# Patient Record
(demographics unavailable — no encounter records)

---

## 2024-11-29 NOTE — ASSESSMENT
[Verbal] : verbal [Patient] : patient [Good - alert, interested, motivated] : Good - alert, interested, motivated [FreeTextEntry1] : Patient examined, history and chart reviewed  Patient returns today for f/u of nail avulsion site appears clean, dry and healthy with no surrounding erythema or edema patient relays no pain to the site left foot 3rd digit checked -apparent nail trauma at nail matrix but nail is well adhered to nail bed and no drainage appreciated -no need for any removal at this time  can continue band aid dressing for a few days and then resume normal shoe wear   - RTC prn

## 2024-11-29 NOTE — VITALS
[Burning] : burning [Aching] : aching [Numb] : numb [FreeTextEntry3] : feet  [FreeTextEntry1] : walking [FreeTextEntry2] : resting

## 2024-11-29 NOTE — PHYSICAL EXAM
[General Appearance - Alert] : alert [General Appearance - In No Acute Distress] : in no acute distress [No Joint Swelling] : no joint swelling [Normal Foot/Ankle] : Both lower extremities were exposed and visualized. Standing exam demonstrates normal foot posture and alignment. Hindfoot exam shows no hindfoot valgus or varus [Skin Color & Pigmentation] : normal skin color and pigmentation [Skin Turgor] : normal skin turgor [Skin Lesions] : no skin lesions [Right Foot Was Examined] : The right foot was examined [Normal Appearance] : normal in appearance [Normal in Appearance] : normal in appearance [Normal] : normal [Full ROM] : with full range of motion [2+] : 2+ in the dorsalis pedis [Position Sense Dec.] : diminished position sense at the level of the toes [Sensation] : the sensory exam was normal to light touch and pinprick [No Focal Deficits] : no focal deficits [Deep Tendon Reflexes (DTR)] : deep tendon reflexes were 2+ and symmetric [Motor Exam] : the motor exam was normal [Vibration Dec.] : diminished vibratory sensation at the level of the toes [Diminished Throughout Right Foot] : diminished sensation with monofilament testing throughout right foot [Diminished Throughout Left Foot] : diminished sensation with monofilament testing throughout left foot [Oriented To Time, Place, And Person] : oriented to person, place, and time [Ankle Swelling (On Exam)] : not present [Varicose Veins Of Lower Extremities] : not present [] : not present [Foot Ulcer] : no foot ulcer [Skin Induration] : no skin induration [Tenderness] : not tender [Erythema] : not erythematous [Delayed in the Right Toes] : normal in the toes [Swelling] : not swollen [Delayed in the Left Toes] : normal in the toes [FreeTextEntry1] : nail removal site apears clean, dry, healthy

## 2024-11-29 NOTE — HISTORY OF PRESENT ILLNESS
[Sneakers] : jade [FreeTextEntry1] : PEREZ RUTLEDGE   presents for a Diabetic foot examination, patient was referred by PCP. Patient complains of numbness and pain of both feet and denies an acute injury.  Patient denies NVFC and denies SOB. pt has some redness on the right big hallux and requested to get it checked. requested nail trimming   Here for f/u of partial nail avulsion R hallux. Also said he kicked an iron leona this week and now would like Left 3rd digit checked

## 2024-11-29 NOTE — PROCEDURE
[Partial Nail Avulsion] : partial nail avulsion on [Right Foot] : was performed on the right foot [Patient] : the patient [___ ml Inj] : [unfilled] ~Uml [1%] : 1%  [FreeTextEntry7] : 4

## 2025-02-05 NOTE — REVIEW OF SYSTEMS
[Negative] : Heme/Lymph [Recent Change In Weight] : ~T recent weight change [Fever] : no fever [Chills] : no chills [Fatigue] : no fatigue [Night Sweats] : no night sweats [Earache] : no earache [Nasal Discharge] : no nasal discharge [Chest Pain] : no chest pain [Palpitations] : no palpitations [Claudication] : no  leg claudication [Orthopena] : no orthopnea [Paroxysmal Nocturnal Dyspnea] : no paroxysmal nocturnal dyspnea [Shortness Of Breath] : no shortness of breath [Wheezing] : no wheezing [Cough] : no cough [Dyspnea on Exertion] : not dyspnea on exertion [Abdominal Pain] : no abdominal pain [Nausea] : no nausea [Constipation] : no constipation [Diarrhea] : no diarrhea [Vomiting] : no vomiting [Heartburn] : no heartburn [Frequency] : no frequency [Joint Pain] : no joint pain [Joint Stiffness] : no joint stiffness [Muscle Pain] : no muscle pain [Back Pain] : no back pain [Joint Swelling] : no joint swelling [Suicidal] : not suicidal

## 2025-02-05 NOTE — PHYSICAL EXAM
[No Acute Distress] : no acute distress [Well Nourished] : well nourished [Well Developed] : well developed [Well-Appearing] : well-appearing [Normal Sclera/Conjunctiva] : normal sclera/conjunctiva [EOMI] : extraocular movements intact [Normal Outer Ear/Nose] : the outer ears and nose were normal in appearance [Supple] : supple [No Respiratory Distress] : no respiratory distress  [No Accessory Muscle Use] : no accessory muscle use [Clear to Auscultation] : lungs were clear to auscultation bilaterally [Normal Rate] : normal rate  [Regular Rhythm] : with a regular rhythm [Normal S1, S2] : normal S1 and S2 [No Murmur] : no murmur heard [Soft] : abdomen soft [Non Tender] : non-tender [No CVA Tenderness] : no CVA  tenderness [No Joint Swelling] : no joint swelling [No Rash] : no rash [de-identified] : resting tremor b/l

## 2025-02-05 NOTE — ASSESSMENT
[FreeTextEntry1] : Patient is a 61-year-old male with PMHx uncontrolled DM, CKD 3, HTN, HLD, urinary incontinence and bipolar disease  #diabetes type II w/ neuropathy - A1C 9.4-->7.9; mounjaro was increased to 15 - following endo, latest endo note noted - following w/ neuro for neuropathy - pod UTD - seen last by optho 3 years ago, referral sent during this visit - Counseled regarding weight loss and lifestyle modifications  # dyslipidemia  - Lipid panel Jan 2025 unremarkable - on lipitor 40 - was on fenofibrate previously, but pharmacy hasnt been refilling this medication due to risk of adverse effects, but latest lipid panel is off of this medication, therefore okay to keep off of this medication - zetia 10  #HTN - added HCTZ 25mg during this visit -continue with Lisinopril 20 mg qd  # urinary incontinence - stable  - on oxybutynin 10 qd  # obesity  - on mounjaro 15  - counselled on lifestyle modification as above, pt expressed understanding  #psychiatric history of bipolar - following up with current psychiatric clinic - paty si/hi - at [place] [facility] - [place] annual physical paperwork filled in office  #HCM - EKG obtained during this visit as part of the annual physical paperwork was unremarkable - Pt said he would consider colonoscopy; GI referral was sent during the last visit, pt was instructed to make a GI appointment - RTC in 6 months with repeat labs

## 2025-02-05 NOTE — PHYSICAL EXAM
[No Acute Distress] : no acute distress [Well Nourished] : well nourished [Well Developed] : well developed [Well-Appearing] : well-appearing [Normal Sclera/Conjunctiva] : normal sclera/conjunctiva [EOMI] : extraocular movements intact [Normal Outer Ear/Nose] : the outer ears and nose were normal in appearance [Supple] : supple [No Respiratory Distress] : no respiratory distress  [No Accessory Muscle Use] : no accessory muscle use [Clear to Auscultation] : lungs were clear to auscultation bilaterally [Normal Rate] : normal rate  [Regular Rhythm] : with a regular rhythm [Normal S1, S2] : normal S1 and S2 [No Murmur] : no murmur heard [Soft] : abdomen soft [Non Tender] : non-tender [No CVA Tenderness] : no CVA  tenderness [No Joint Swelling] : no joint swelling [No Rash] : no rash [de-identified] : resting tremor b/l

## 2025-02-05 NOTE — HISTORY OF PRESENT ILLNESS
[FreeTextEntry1] : 6 month follow up [de-identified] : Patient is a 61-year-old male with PMHx poorly controlled DM with peripheral neuropathy, HTN, HLD, chronic urinary incontinence and bipolar presenting for follow up; Pt ambulates w/ cane  Pt reports he is in usual state of health and comes in with forms from [place]. Otherwise denies recent illness, sob, cp.   Pt is also requesting a prescription for a shower chair during this visit

## 2025-02-05 NOTE — HISTORY OF PRESENT ILLNESS
[FreeTextEntry1] : 6 month follow up [de-identified] : Patient is a 61-year-old male with PMHx poorly controlled DM with peripheral neuropathy, HTN, HLD, chronic urinary incontinence and bipolar presenting for follow up; Pt ambulates w/ cane  Pt reports he is in usual state of health and comes in with forms from [place]. Otherwise denies recent illness, sob, cp.   Pt is also requesting a prescription for a shower chair during this visit

## 2025-02-05 NOTE — ASSESSMENT
[FreeTextEntry1] : Patient is a 61-year-old male with PMHx uncontrolled DM, CKD 3, HTN, HLD, urinary incontinence and bipolar disease  #diabetes type II w/ neuropathy - A1C 9.4-->7.9; mounjaro was increased to 15 - following endo, latest endo note noted - following w/ neuro for neuropathy - pod UTD - seen last by optho 3 years ago, referral sent during this visit - Counseled regarding weight loss and lifestyle modifications  # dyslipidemia  - Lipid panel Jan 2025 unremarkable - on lipitor 40 - was on fenofibrate previously, but pharmacy hasnt been refilling this medication due to risk of adverse effects, but latest lipid panel is off of this medication, therefore okay to keep off of this medication - zetia 10  #HTN - added HCTZ 25mg during this visit -continue with Lisinopril 20 mg qd  # urinary incontinence - stable  - on oxybutynin 10 qd  # obesity  - on mounjaro 15  - counselled on lifestyle modification as above, pt expressed understanding  #psychiatric history of bipolar - following up with current psychiatric clinic - paty si/hi - at [place] [facility] - [place] annual physical paperwork filled in office  #HCM - EKG obtained during this visit as part of the annual physical paperwork was unremarkable - Pt said he would consider colonoscopy; GI referral was sent during the last visit, pt was instructed to make a GI appointment - RTC in 6 months with repeat labs

## 2025-02-25 NOTE — PHYSICAL EXAM
[General Appearance - Alert] : alert [General Appearance - In No Acute Distress] : in no acute distress [Ankle Swelling (On Exam)] : not present [Varicose Veins Of Lower Extremities] : not present [No Joint Swelling] : no joint swelling [Normal Foot/Ankle] : Both lower extremities were exposed and visualized. Standing exam demonstrates normal foot posture and alignment. Hindfoot exam shows no hindfoot valgus or varus [Skin Color & Pigmentation] : normal skin color and pigmentation [Skin Turgor] : normal skin turgor [] : no rash [Skin Lesions] : no skin lesions [Foot Ulcer] : no foot ulcer [Skin Induration] : no skin induration [Right Foot Was Examined] : The right foot was examined [Normal Appearance] : normal in appearance [Tenderness] : not tender [Erythema] : not erythematous [Delayed in the Right Toes] : normal in the toes [Normal in Appearance] : normal in appearance [Normal] : normal [Full ROM] : with full range of motion [Swelling] : not swollen [Delayed in the Left Toes] : normal in the toes [2+] : 2+ in the dorsalis pedis [Position Sense Dec.] : diminished position sense at the level of the toes [FreeTextEntry1] : nail removal site apears clean, dry, healthy  [Sensation] : the sensory exam was normal to light touch and pinprick [No Focal Deficits] : no focal deficits [Deep Tendon Reflexes (DTR)] : deep tendon reflexes were 2+ and symmetric [Motor Exam] : the motor exam was normal [Vibration Dec.] : diminished vibratory sensation at the level of the toes [Diminished Throughout Right Foot] : diminished sensation with monofilament testing throughout right foot [Diminished Throughout Left Foot] : diminished sensation with monofilament testing throughout left foot [Oriented To Time, Place, And Person] : oriented to person, place, and time

## 2025-02-25 NOTE — HISTORY OF PRESENT ILLNESS
[Sneakers] : jade [FreeTextEntry1] : PEREZ RUTLEDGE   presents for a Diabetic foot examination, patient was referred by PCP. Patient complains of numbness and pain of both feet and denies an acute injury.  Patient denies NVFC and denies SOB. pt has some redness on the right big hallux and requested to get it checked. requested nail trimming   Here for f/u of partial nail avulsion R hallux

## 2025-02-25 NOTE — ASSESSMENT
[FreeTextEntry1] : Patient examined, history and chart reviewed  Patient returns today for f/u of nail avulsion site appears clean, dry and healthy with no surrounding erythema or edema patient relays no pain to the site -no need for any removal at this time nails debrided x10    - RTC prn      [Verbal] : verbal [Patient] : patient [Good - alert, interested, motivated] : Good - alert, interested, motivated

## 2025-05-28 NOTE — ASSESSMENT
[FreeTextEntry1] :  61-year-old male with PMHx uncontrolled DM, CKD 3, HTN, HLD, urinary incontinence and bipolar disease here for follow up.   #Resting tremors - psych requested to speak with PCP - STRATED ON METOPROLOL 25MG QD - neurology follow up  #diabetes type II w/ neuropathy - A1C 9.4-->7.9; mounjaro was increased to 15 - following endo, latest endo note noted - following w/ neuro for neuropathy - pod UTD - seen last by optho 3 years ago, referral sent during this visit - Counseled regarding weight loss and lifestyle modifications  # dyslipidemia - Lipid panel Jan 2025 unremarkable - on lipitor 40 - was on fenofibrate previously, but pharmacy hasnt been refilling this medication due to risk of adverse effects, but latest lipid panel is off of this medication, therefore okay to keep off of this medication - zetia 10  #HTN - added HCTZ 25mg during this visit -continue with Lisinopril 20 mg qd  # urinary incontinence - stable - on oxybutynin 10 qd  # obesity - on mounjaro 15 - counselled on lifestyle modification as above, pt expressed understanding  #psychiatric history of bipolar - following up with current psychiatric clinic - denies si/hi - at [place] [facility] - [place] annual physical paperwork filled in office  #HCM - EKG obtained during last visit as part of the annual physical paperwork was unremarkable - Pt said he would consider colonoscopy; GI referral was sent during the last visit, pt was instructed to make a GI appointment - RTC in 6 months with repeat labs.

## 2025-05-28 NOTE — HISTORY OF PRESENT ILLNESS
[FreeTextEntry1] : follow up.  [de-identified] : 61-year-old male with PMHx poorly controlled DM with peripheral neuropathy, HTN, HLD, chronic urinary incontinence and bipolar presenting for follow up; Pt ambulates w/ cane Pt reports he is in usual state of health and comes in with forms from [place]. Otherwise denies recent illness, sob, cp. Pt has resting tremors likely side effect of medications.

## 2025-05-28 NOTE — ASSESSMENT
[FreeTextEntry1] :  61-year-old male with PMHx uncontrolled DM, CKD 3, HTN, HLD, urinary incontinence and bipolar disease here for follow up.   #Resting tremors - psych requested to speak with PCP - STRATED ON METOPROLOL 25MG QD - neurology follow up  #diabetes type II w/ neuropathy - A1C 9.4-->7.9; mounjaro was increased to 15 - following endo, latest endo note noted - following w/ neuro for neuropathy - pod UTD - seen last by optho 3 years ago, referral sent during this visit - Counseled regarding weight loss and lifestyle modifications  # dyslipidemia - Lipid panel Jan 2025 unremarkable - on lipitor 40 - was on fenofibrate previously, but pharmacy hasnt been refilling this medication due to risk of adverse effects, but latest lipid panel is off of this medication, therefore okay to keep off of this medication - zetia 10  #HTN - added HCTZ 25mg during this visit -continue with Lisinopril 20 mg qd  # urinary incontinence - stable - on oxybutynin 10 qd  # obesity - on mounjaro 15 - counselled on lifestyle modification as above, pt expressed understanding  #psychiatric history of bipolar - following up with current psychiatric clinic - denies si/hi - at [facility] - [place] annual physical paperwork filled in office  #HCM - EKG obtained during last visit as part of the annual physical paperwork was unremarkable - Pt said he would consider colonoscopy; GI referral was sent during the last visit, pt was instructed to make a GI appointment - RTC in 6 months with repeat labs.

## 2025-05-28 NOTE — HISTORY OF PRESENT ILLNESS
[FreeTextEntry1] : follow up.  [de-identified] : 61-year-old male with PMHx poorly controlled DM with peripheral neuropathy, HTN, HLD, chronic urinary incontinence and bipolar presenting for follow up; Pt ambulates w/ cane Pt reports he is in usual state of health and comes in with forms from [place]. Otherwise denies recent illness, sob, cp. Pt has resting tremors likely side effect of medications.

## 2025-05-28 NOTE — PHYSICAL EXAM
[No Acute Distress] : no acute distress [No Respiratory Distress] : no respiratory distress  [No Accessory Muscle Use] : no accessory muscle use [Clear to Auscultation] : lungs were clear to auscultation bilaterally [Normal Rate] : normal rate  [No Edema] : there was no peripheral edema [Soft] : abdomen soft [Non Tender] : non-tender [Non-distended] : non-distended [No Joint Swelling] : no joint swelling [No Rash] : no rash

## 2025-06-03 NOTE — PHYSICAL EXAM
[General Appearance - Alert] : alert [General Appearance - In No Acute Distress] : in no acute distress [No Joint Swelling] : no joint swelling [Normal Foot/Ankle] : Both lower extremities were exposed and visualized. Standing exam demonstrates normal foot posture and alignment. Hindfoot exam shows no hindfoot valgus or varus [Skin Color & Pigmentation] : normal skin color and pigmentation [Skin Turgor] : normal skin turgor [Skin Lesions] : no skin lesions [Right Foot Was Examined] : The right foot was examined [Normal Appearance] : normal in appearance [Normal in Appearance] : normal in appearance [Normal] : normal [Full ROM] : with full range of motion [2+] : 2+ in the dorsalis pedis [Position Sense Dec.] : diminished position sense at the level of the toes [Sensation] : the sensory exam was normal to light touch and pinprick [No Focal Deficits] : no focal deficits [Deep Tendon Reflexes (DTR)] : deep tendon reflexes were 2+ and symmetric [Motor Exam] : the motor exam was normal [Vibration Dec.] : diminished vibratory sensation at the level of the toes [Diminished Throughout Right Foot] : diminished sensation with monofilament testing throughout right foot [Diminished Throughout Left Foot] : diminished sensation with monofilament testing throughout left foot [Oriented To Time, Place, And Person] : oriented to person, place, and time [Ankle Swelling (On Exam)] : not present [Varicose Veins Of Lower Extremities] : not present [] : not present [Foot Ulcer] : no foot ulcer [Skin Induration] : no skin induration [Tenderness] : not tender [Erythema] : not erythematous [Delayed in the Right Toes] : normal in the toes [Swelling] : not swollen [Delayed in the Left Toes] : normal in the toes [FreeTextEntry1] : Toenails 1-10 dystrophic elongated

## 2025-06-03 NOTE — HISTORY OF PRESENT ILLNESS
[Sneakers] : jade [FreeTextEntry1] : PEREZ RUTLEDGE   presents for a Diabetic foot examination and routine nail care. No other pedal complaints today

## 2025-06-03 NOTE — ASSESSMENT
[Verbal] : verbal [Patient] : patient [Good - alert, interested, motivated] : Good - alert, interested, motivated [FreeTextEntry1] : Patient examined, history and chart reviewed DM foot check performed Nails debrided x10 with sterile nail nippers RTC 3 months

## 2025-06-11 NOTE — REVIEW OF SYSTEMS
[Arm Weakness] : arm weakness [Leg Weakness] : leg weakness [Poor Coordination] : poor coordination [Numbness] : numbness [Tingling] : tingling [Abnormal Sensation] : an abnormal sensation [Difficulty Walking] : difficulty walking [Ataxia] : ataxia [Negative] : Heme/Lymph

## 2025-06-11 NOTE — HISTORY OF PRESENT ILLNESS
[FreeTextEntry1] : Original Presentation : Mr. Fernandes is a 59-year-old male who presents today in neurologic consultation regarding his neuropathy which he was diagnosed as having 3 years ago by Dr. Hylton. We do not have his EMG records at the present time. He has had diabetes since 1996. He has had increasing gait disturbance, equilibrium problems, and weakness in both lower extremities and left arm. He did fall about 2 years ago and currently uses a walker. Patient also has tingling in his toes and tremors in his hands. Tremors have been attributed to the neuroleptics he's been taking. He has history of bipolar disorder and multiple suicide attempts.  MRI Brain 5.30.23 : I Nonspecific punctate T2 hyperintensity are seen in each cerebral hemisphere without  mass-effect restricted diffusion or vasogenic edema and based on the patients age is most likely  secondary to ischemic microvascular changes. Differential diagnoses as above. Ventricular, sulcal, and  cisternal dilatation, more than expected for the patients age.  EMG Lower : + diffuse sensori-neuropathy b/l  EMG upper : + evidence of compression of the left median nerve consistent with left carpal tunnel   Today : Today I had the pleasure of seeing Mr Fernandes in our office for follow up.  The patients previous history and physical findings have been reviewed.   Mr Fernandes remains under our care for diabetic polyneuropathy to the hands and feet, chronic conditions for which he is receiving active treatment for. He reports that in 2020 he went for a walk and was unable to walk home due to severe weakness, he was admitted the hospital, MRIS of the Brain,  Lumbar and cervical spine were done showing disc herniations without explanation of his weakness. He was later discharged to a nursing home where he did physical therapy and had mild improvement in his strength while ambulating.  On exam he maintains good isolated strength to all extremities 5/5 distally and 4-/5 proximally. He denies any pain and has no tenderness to palpation. His reflexes are 2+ throughout without hyperreflexia or clonus. We discussed the last visit, that his tremors are most likely the result of being treated with neuroleptics for schizophrenia and bipolar disorder. He has been on and continues to be on neuroleptics, specifically, Abilify.

## 2025-06-11 NOTE — PHYSICAL EXAM
[General Appearance - Alert] : alert [General Appearance - In No Acute Distress] : in no acute distress [Oriented To Time, Place, And Person] : oriented to person, place, and time [Affect] : the affect was normal [Impaired Insight] : insight and judgment were intact [Place] : oriented to place [Person] : oriented to person [Time] : oriented to time [Concentration Intact] : normal concentrating ability [Visual Intact] : visual attention was ~T not ~L decreased [Naming Objects] : no difficulty naming common objects [Repeating Phrases] : no difficulty repeating a phrase [Writing A Sentence] : no difficulty writing a sentence [Fluency] : fluency intact [Reading] : reading intact [Comprehension] : comprehension intact [Past History] : adequate knowledge of personal past history [Cranial Nerves Oculomotor (III)] : extraocular motion intact [Cranial Nerves Optic (II)] : visual acuity intact bilaterally,  visual fields full to confrontation, pupils equal round and reactive to light [Cranial Nerves Trigeminal (V)] : facial sensation intact symmetrically [Cranial Nerves Facial (VII)] : face symmetrical [Cranial Nerves Vestibulocochlear (VIII)] : hearing was intact bilaterally [Cranial Nerves Glossopharyngeal (IX)] : tongue and palate midline [Cranial Nerves Accessory (XI - Cranial And Spinal)] : head turning and shoulder shrug symmetric [Cranial Nerves Hypoglossal (XII)] : there was no tongue deviation with protrusion [Motor Strength] : muscle strength was normal in all four extremities [Motor Tone] : muscle tone was normal in all four extremities [No Muscle Atrophy] : normal bulk in all four extremities [Sensation Tactile Decrease] : light touch was intact [Abnormal Walk] : normal gait [Balance] : balance was intact [2+] : Ankle jerk right 2+ [Neck Appearance] : the appearance of the neck was normal [No Spinal Tenderness] : no spinal tenderness [Involuntary Movements] : no involuntary movements were seen [FreeTextEntry1] : PHYSICAL EXAMINATION:\par  Head: Normocephalic, atraumatic. Negative TA tenderness/prominence. \par  \par  Neck: Supple with full range of motion; nontender with negative bilateral Spurling's signs. \par  \par  Spine: Full range of motion; nontender. Negative straight leg raise maneuvers. \par  \par  Extremities: Non-tender. Atraumatic. Negative Tinel's signs. \par  \par  NEUROLOGICAL EXAMINATION: \par  \par  Mental Status: Patient is a good informant with intact orientation, attention, concentration, recent and remote memory. Language evaluation reveals no evidence of aphasia. Fund of knowledge is normal. \par  \par  Cranial Nerves Cranial Nerves: \par  \par  II, III, IV, VI: Pupils are equal, round, and reactive to light and accommodation. No evidence of afferent pupillary defect. Visual fields are full to confrontation. Eye movements are full without evidence of nystagmus or internuclear ophthalmoplegia. Funduscopic examination reveals sharp disc margins. \par  \par  V: Normal jaw movements. Normal facial sensation. \par  \par  VII: Normal facial motor testing. \par  \par  VIII: Grossly normal hearing bilaterally. \par  \par  IX, X: Palate moves symmetrically. No dysarthria. \par  \par  XI: Normal shoulder shrug and sternocleidomastoid power. \par  \par  XII: Tongue appears normal and protrudes in the midline. \par  \par  Motor: Normal bulk, tone, and power throughout. \par  \par  Muscle Stretch Reflexes (right/left): 2+ symmetrical. \par  \par  Plantar Responses: Flexor bilaterally. \par  \par  Coordination: Resting tremor in both UEs. No cogwheel rigidity. \par  \par  Sensation: Normal primary sensation. Normal double simultaneous stimulation. \par  \par  Gait and Station: Unsteady Romberg but doesn't fall. Unable to do tandem because of disequilibrium and tremor in his legs.\par   [Past-pointing] : there was no past-pointing [Tremor] : no tremor present [Plantar Reflex Right Only] : normal on the right [Plantar Reflex Left Only] : normal on the left

## 2025-06-11 NOTE — ASSESSMENT
[FreeTextEntry1] : Impression is of neuroleptic induced tremor. We discussed the fact that neuroleptic induced extrapyramidal type tremors are not readily reversible if he continues to take the neuroleptics, which his psychiatrist feels he needs. He has never been tested for Parkinson's disease, although he does not have stigmata clinical evidence of Parkinson's disease. The definitive test would be the SAMEER SPECT scan of the brain which I explained to him. I am ordering a SAMEER SPECT scan to exclusively eliminate Parkinson's disease as a cause of his tremors. We will see him in follow-up in 1 month for reevaluation.     Entered by Jenelle Henderson acting as scribe for Dr. Chandler.     The documentation recorded by the scribe, in my presence, accurately reflects the service I personally performed, and the decisions made by me with my edits as appropriate. Walt Chandler MD, FAAN, FACP Diplomate [employer] Board of Psychiatry & Neurology.

## 2025-07-02 NOTE — REVIEW OF SYSTEMS
[Fever] : no fever [Chills] : no chills [Chest Pain] : no chest pain [Palpitations] : no palpitations [Claudication] : no  leg claudication [Shortness Of Breath] : no shortness of breath [Wheezing] : no wheezing [Cough] : no cough [Abdominal Pain] : no abdominal pain [Nausea] : no nausea [Constipation] : no constipation [Diarrhea] : no diarrhea [Vomiting] : no vomiting [Heartburn] : no heartburn [Melena] : no melena [Dysuria] : no dysuria [de-identified] : tremors in bl upper arms

## 2025-07-02 NOTE — PLAN
[FreeTextEntry1] : 61-year-old male with PMHx poorly controlled DM with peripheral neuropathy, HTN, HLD, chronic urinary incontinence and bipolar presenting for follow up.   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 as per neurology - Following with neurology - NM Brain done June 2025: no findings of parkinsons - Tried metoprolol after apt in May 2025 however felt dizzy and stopped it - neurology follow up, next apt July 17 2025  #diabetes type II w/ neuropathy - A1C 9.4 (Jul 2024) => 7.9 (Jan 2025) - On Mounjaro 15 - following endo, next apt july 24 - following w/ neuro for neuropathy - Podiatry next apt Sept 2025 - Ophtho: seen last by optho 3 years ago, declining referral at this time  - Counseled regarding weight loss and lifestyle modifications  # dyslipidemia - Lipid panel Jan 2025 unremarkable - on lipitor 40 - was on fenofibrate previously, but pharmacy hasnt been refilling this medication due to risk of adverse effects, but latest lipid panel is off of this medication, therefore okay to keep off of this medication - zetia 10  #HTN - BP today 133/78 - was prescriibed HCTZ at last apt however not taking it, will hold off for now as patient reports BP log at home never above SBP 130s - continue with Lisinopril 20 mg qd  # urinary incontinence - stable - on oxybutynin 10 qd  # obesity - on mounjaro 15 - counselled on lifestyle modification as above, pt expressed understanding  #psychiatric history of bipolar - following up with current psychiatric clinic at [facility] - denies si/hi - [place] annual physical paperwork filled in office  #HCM - Colono done 10y+ ago  - Pt said he would consider repeat colonoscopy; GI referral was sent during the last visit, but patient does not want to do it right now, he will think about it  - RTC in 6 months with repeat labs.

## 2025-07-02 NOTE — PHYSICAL EXAM
[No Acute Distress] : no acute distress [Well Nourished] : well nourished [Well Developed] : well developed [No Respiratory Distress] : no respiratory distress  [No Accessory Muscle Use] : no accessory muscle use [Clear to Auscultation] : lungs were clear to auscultation bilaterally [Normal Rate] : normal rate  [Regular Rhythm] : with a regular rhythm [Normal S1, S2] : normal S1 and S2 [No Murmur] : no murmur heard [Soft] : abdomen soft [Non Tender] : non-tender [Non-distended] : non-distended [No HSM] : no HSM [Normal Bowel Sounds] : normal bowel sounds [de-identified] : tremors in bilateral arms

## 2025-07-02 NOTE — HISTORY OF PRESENT ILLNESS
[FreeTextEntry1] : Follow up  [de-identified] : 61-year-old male with PMHx poorly controlled DM with peripheral neuropathy, HTN, HLD, chronic urinary incontinence and bipolar presenting for follow up. He is feeling well since last visit, he is following with neurology for his tremors. He ambulates with a cane.

## 2025-07-02 NOTE — PHYSICAL EXAM
[No Acute Distress] : no acute distress [Well Nourished] : well nourished [Well Developed] : well developed [No Respiratory Distress] : no respiratory distress  [No Accessory Muscle Use] : no accessory muscle use [Clear to Auscultation] : lungs were clear to auscultation bilaterally [Normal Rate] : normal rate  [Regular Rhythm] : with a regular rhythm [Normal S1, S2] : normal S1 and S2 [No Murmur] : no murmur heard [Soft] : abdomen soft [Non Tender] : non-tender [Non-distended] : non-distended [No HSM] : no HSM [Normal Bowel Sounds] : normal bowel sounds [de-identified] : tremors in bilateral arms

## 2025-07-02 NOTE — PLAN
[FreeTextEntry1] : 61-year-old male with PMHx poorly controlled DM with peripheral neuropathy, HTN, HLD, chronic urinary incontinence and bipolar presenting for follow up.   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 as per neurology - Following with neurology - NM Brain done June 2025: no findings of parkinsons - Tried metoprolol after apt in May 2025 however felt dizzy and stopped it - neurology follow up, next apt July 17 2025  #diabetes type II w/ neuropathy - A1C 9.4 (Jul 2024) => 7.9 (Jan 2025) - On Mounjaro 15 - following endo, next apt july 24 - following w/ neuro for neuropathy - Podiatry next apt Sept 2025 - Ophtho: seen last by optho 3 years ago, declining referral at this time  - Counseled regarding weight loss and lifestyle modifications  # dyslipidemia - Lipid panel Jan 2025 unremarkable - on lipitor 40 - was on fenofibrate previously, but pharmacy hasnt been refilling this medication due to risk of adverse effects, but latest lipid panel is off of this medication, therefore okay to keep off of this medication - zetia 10  #HTN - BP today 133/78 - was prescriibed HCTZ at last apt however not taking it, will hold off for now as patient reports BP log at home never above SBP 130s - continue with Lisinopril 20 mg qd  # urinary incontinence - stable - on oxybutynin 10 qd  # obesity - on mounjaro 15 - counselled on lifestyle modification as above, pt expressed understanding  #psychiatric history of bipolar - following up with current psychiatric clinic at [facility] - denies si/hi - [place] annual physical paperwork filled in office  #HCM - Colono done 10y+ ago  - Pt said he would consider repeat colonoscopy; GI referral was sent during the last visit, but patient does not want to do it right now, he will think about it  - RTC in 6 months with repeat labs.

## 2025-07-02 NOTE — HISTORY OF PRESENT ILLNESS
[FreeTextEntry1] : Follow up  [de-identified] : 61-year-old male with PMHx poorly controlled DM with peripheral neuropathy, HTN, HLD, chronic urinary incontinence and bipolar presenting for follow up. He is feeling well since last visit, he is following with neurology for his tremors. He ambulates with a cane.

## 2025-07-02 NOTE — REVIEW OF SYSTEMS
[Fever] : no fever [Chills] : no chills [Chest Pain] : no chest pain [Palpitations] : no palpitations [Claudication] : no  leg claudication [Shortness Of Breath] : no shortness of breath [Wheezing] : no wheezing [Cough] : no cough [Abdominal Pain] : no abdominal pain [Nausea] : no nausea [Constipation] : no constipation [Diarrhea] : no diarrhea [Vomiting] : no vomiting [Heartburn] : no heartburn [Melena] : no melena [Dysuria] : no dysuria [de-identified] : tremors in bl upper arms

## 2025-07-17 NOTE — HISTORY OF PRESENT ILLNESS
[FreeTextEntry1] : Today : Today I had the pleasure of seeing Mr Fernandes in our office for follow up.  The patients previous history and physical findings have been reviewed.   Mr Fernandes remains under our care for diabetic polyneuropathy to the hands and feet, chronic conditions for which he is receiving active treatment for. He reports that in 2020 he went for a walk and was unable to walk home due to severe weakness, he was admitted the hospital, MRIS of the Brain,  Lumbar and cervical spine were done showing disc herniations without explanation of his weakness. He was later discharged to a nursing home where he did physical therapy and had mild improvement in his strength while ambulating.  On exam he maintains good isolated strength to all extremities 5/5 distally and 4-/5 proximally. He denies any pain and has no tenderness to palpation. His reflexes are 2+ throughout without hyperreflexia or clonus. We discussed the last visit, that his tremors are most likely the result of being treated with neuroleptics for schizophrenia and bipolar disorder. He has been on and continues to be on neuroleptics, specifically, Abilify. Patient is seeing us for coarse tremors, right greater than left. SAMEER SPECT scan was negative for Parkinson's disease. He has no family history of tremors. Reviewing his medications, the Abilify and Depakote can be associated with tremors.

## 2025-07-17 NOTE — PHYSICAL EXAM
[General Appearance - Alert] : alert [General Appearance - In No Acute Distress] : in no acute distress [Oriented To Time, Place, And Person] : oriented to person, place, and time [Impaired Insight] : insight and judgment were intact [Affect] : the affect was normal [Person] : oriented to person [Place] : oriented to place [Time] : oriented to time [Concentration Intact] : normal concentrating ability [Visual Intact] : visual attention was ~T not ~L decreased [Naming Objects] : no difficulty naming common objects [Repeating Phrases] : no difficulty repeating a phrase [Writing A Sentence] : no difficulty writing a sentence [Fluency] : fluency intact [Comprehension] : comprehension intact [Reading] : reading intact [Past History] : adequate knowledge of personal past history [Cranial Nerves Optic (II)] : visual acuity intact bilaterally,  visual fields full to confrontation, pupils equal round and reactive to light [Cranial Nerves Oculomotor (III)] : extraocular motion intact [Cranial Nerves Trigeminal (V)] : facial sensation intact symmetrically [Cranial Nerves Facial (VII)] : face symmetrical [Cranial Nerves Vestibulocochlear (VIII)] : hearing was intact bilaterally [Cranial Nerves Glossopharyngeal (IX)] : tongue and palate midline [Cranial Nerves Accessory (XI - Cranial And Spinal)] : head turning and shoulder shrug symmetric [Cranial Nerves Hypoglossal (XII)] : there was no tongue deviation with protrusion [Motor Tone] : muscle tone was normal in all four extremities [Motor Strength] : muscle strength was normal in all four extremities [No Muscle Atrophy] : normal bulk in all four extremities [Sensation Tactile Decrease] : light touch was intact [Abnormal Walk] : normal gait [Balance] : balance was intact [2+] : Ankle jerk left 2+ [Neck Appearance] : the appearance of the neck was normal [No Spinal Tenderness] : no spinal tenderness [Involuntary Movements] : no involuntary movements were seen [FreeTextEntry1] : PHYSICAL EXAMINATION:\par  Head: Normocephalic, atraumatic. Negative TA tenderness/prominence. \par  \par  Neck: Supple with full range of motion; nontender with negative bilateral Spurling's signs. \par  \par  Spine: Full range of motion; nontender. Negative straight leg raise maneuvers. \par  \par  Extremities: Non-tender. Atraumatic. Negative Tinel's signs. \par  \par  NEUROLOGICAL EXAMINATION: \par  \par  Mental Status: Patient is a good informant with intact orientation, attention, concentration, recent and remote memory. Language evaluation reveals no evidence of aphasia. Fund of knowledge is normal. \par  \par  Cranial Nerves Cranial Nerves: \par  \par  II, III, IV, VI: Pupils are equal, round, and reactive to light and accommodation. No evidence of afferent pupillary defect. Visual fields are full to confrontation. Eye movements are full without evidence of nystagmus or internuclear ophthalmoplegia. Funduscopic examination reveals sharp disc margins. \par  \par  V: Normal jaw movements. Normal facial sensation. \par  \par  VII: Normal facial motor testing. \par  \par  VIII: Grossly normal hearing bilaterally. \par  \par  IX, X: Palate moves symmetrically. No dysarthria. \par  \par  XI: Normal shoulder shrug and sternocleidomastoid power. \par  \par  XII: Tongue appears normal and protrudes in the midline. \par  \par  Motor: Normal bulk, tone, and power throughout. \par  \par  Muscle Stretch Reflexes (right/left): 2+ symmetrical. \par  \par  Plantar Responses: Flexor bilaterally. \par  \par  Coordination: Resting tremor in both UEs. No cogwheel rigidity. \par  \par  Sensation: Normal primary sensation. Normal double simultaneous stimulation. \par  \par  Gait and Station: Unsteady Romberg but doesn't fall. Unable to do tandem because of disequilibrium and tremor in his legs.\par   [Past-pointing] : there was no past-pointing [Tremor] : no tremor present [Plantar Reflex Right Only] : normal on the right [Plantar Reflex Left Only] : normal on the left

## 2025-07-17 NOTE — ASSESSMENT
[FreeTextEntry1] : Impression is of intention tremor. I started him on Primidone 50mg BID. He is bipolar so the question is what his psychiatrist thinks of whether it would be safe to ween Depakote as much as possible to see if that improves his intention type tremor. We will see him in follow-up for reevaluation.     Entered by Jenelle Henderson acting as scribe for Dr. Chandler.     The documentation recorded by the scribe, in my presence, accurately reflects the service I personally performed, and the decisions made by me with my edits as appropriate. Walt Chandler MD, FAAN, FACP Diplomate American Board of Psychiatry & Neurology.